# Patient Record
Sex: FEMALE | Race: OTHER | NOT HISPANIC OR LATINO | URBAN - METROPOLITAN AREA
[De-identification: names, ages, dates, MRNs, and addresses within clinical notes are randomized per-mention and may not be internally consistent; named-entity substitution may affect disease eponyms.]

---

## 2019-04-15 ENCOUNTER — EMERGENCY (EMERGENCY)
Facility: HOSPITAL | Age: 19
LOS: 1 days | Discharge: HOME | End: 2019-04-15
Attending: EMERGENCY MEDICINE | Admitting: EMERGENCY MEDICINE
Payer: MEDICAID

## 2019-04-15 VITALS
HEART RATE: 71 BPM | TEMPERATURE: 98 F | DIASTOLIC BLOOD PRESSURE: 66 MMHG | SYSTOLIC BLOOD PRESSURE: 111 MMHG | OXYGEN SATURATION: 100 % | RESPIRATION RATE: 18 BRPM

## 2019-04-15 VITALS
SYSTOLIC BLOOD PRESSURE: 126 MMHG | TEMPERATURE: 98 F | OXYGEN SATURATION: 100 % | HEART RATE: 77 BPM | RESPIRATION RATE: 18 BRPM | DIASTOLIC BLOOD PRESSURE: 49 MMHG

## 2019-04-15 LAB
ALBUMIN SERPL ELPH-MCNC: 4.4 G/DL — SIGNIFICANT CHANGE UP (ref 3.3–5)
ALP SERPL-CCNC: 59 U/L — SIGNIFICANT CHANGE UP (ref 40–120)
ALT FLD-CCNC: 7 U/L — SIGNIFICANT CHANGE UP (ref 4–33)
ANION GAP SERPL CALC-SCNC: 12 MMO/L — SIGNIFICANT CHANGE UP (ref 7–14)
AST SERPL-CCNC: 15 U/L — SIGNIFICANT CHANGE UP (ref 4–32)
BASOPHILS # BLD AUTO: 0.07 K/UL — SIGNIFICANT CHANGE UP (ref 0–0.2)
BASOPHILS NFR BLD AUTO: 1.1 % — SIGNIFICANT CHANGE UP (ref 0–2)
BILIRUB SERPL-MCNC: 0.2 MG/DL — SIGNIFICANT CHANGE UP (ref 0.2–1.2)
BUN SERPL-MCNC: 8 MG/DL — SIGNIFICANT CHANGE UP (ref 7–23)
CALCIUM SERPL-MCNC: 9.7 MG/DL — SIGNIFICANT CHANGE UP (ref 8.4–10.5)
CHLORIDE SERPL-SCNC: 103 MMOL/L — SIGNIFICANT CHANGE UP (ref 98–107)
CO2 SERPL-SCNC: 24 MMOL/L — SIGNIFICANT CHANGE UP (ref 22–31)
CREAT SERPL-MCNC: 0.78 MG/DL — SIGNIFICANT CHANGE UP (ref 0.5–1.3)
EOSINOPHIL # BLD AUTO: 0.02 K/UL — SIGNIFICANT CHANGE UP (ref 0–0.5)
EOSINOPHIL NFR BLD AUTO: 0.3 % — SIGNIFICANT CHANGE UP (ref 0–6)
GLUCOSE SERPL-MCNC: 89 MG/DL — SIGNIFICANT CHANGE UP (ref 70–99)
HCT VFR BLD CALC: 36.6 % — SIGNIFICANT CHANGE UP (ref 34.5–45)
HGB BLD-MCNC: 11.3 G/DL — LOW (ref 11.5–15.5)
IMM GRANULOCYTES NFR BLD AUTO: 0.2 % — SIGNIFICANT CHANGE UP (ref 0–1.5)
LYMPHOCYTES # BLD AUTO: 2.11 K/UL — SIGNIFICANT CHANGE UP (ref 1–3.3)
LYMPHOCYTES # BLD AUTO: 32.9 % — SIGNIFICANT CHANGE UP (ref 13–44)
MCHC RBC-ENTMCNC: 27 PG — SIGNIFICANT CHANGE UP (ref 27–34)
MCHC RBC-ENTMCNC: 30.9 % — LOW (ref 32–36)
MCV RBC AUTO: 87.6 FL — SIGNIFICANT CHANGE UP (ref 80–100)
MONOCYTES # BLD AUTO: 0.48 K/UL — SIGNIFICANT CHANGE UP (ref 0–0.9)
MONOCYTES NFR BLD AUTO: 7.5 % — SIGNIFICANT CHANGE UP (ref 2–14)
NEUTROPHILS # BLD AUTO: 3.73 K/UL — SIGNIFICANT CHANGE UP (ref 1.8–7.4)
NEUTROPHILS NFR BLD AUTO: 58 % — SIGNIFICANT CHANGE UP (ref 43–77)
NRBC # FLD: 0 K/UL — SIGNIFICANT CHANGE UP (ref 0–0)
PLATELET # BLD AUTO: 276 K/UL — SIGNIFICANT CHANGE UP (ref 150–400)
PMV BLD: 11.1 FL — SIGNIFICANT CHANGE UP (ref 7–13)
POTASSIUM SERPL-MCNC: 4.2 MMOL/L — SIGNIFICANT CHANGE UP (ref 3.5–5.3)
POTASSIUM SERPL-SCNC: 4.2 MMOL/L — SIGNIFICANT CHANGE UP (ref 3.5–5.3)
PROT SERPL-MCNC: 7.1 G/DL — SIGNIFICANT CHANGE UP (ref 6–8.3)
RBC # BLD: 4.18 M/UL — SIGNIFICANT CHANGE UP (ref 3.8–5.2)
RBC # FLD: 15.7 % — HIGH (ref 10.3–14.5)
SODIUM SERPL-SCNC: 139 MMOL/L — SIGNIFICANT CHANGE UP (ref 135–145)
WBC # BLD: 6.42 K/UL — SIGNIFICANT CHANGE UP (ref 3.8–10.5)
WBC # FLD AUTO: 6.42 K/UL — SIGNIFICANT CHANGE UP (ref 3.8–10.5)

## 2019-04-15 PROCEDURE — 71045 X-RAY EXAM CHEST 1 VIEW: CPT | Mod: 26

## 2019-04-15 PROCEDURE — 99285 EMERGENCY DEPT VISIT HI MDM: CPT

## 2019-04-15 RX ORDER — IBUPROFEN 200 MG
600 TABLET ORAL ONCE
Qty: 0 | Refills: 0 | Status: COMPLETED | OUTPATIENT
Start: 2019-04-15 | End: 2019-04-15

## 2019-04-15 RX ORDER — MECLIZINE HCL 12.5 MG
25 TABLET ORAL ONCE
Qty: 0 | Refills: 0 | Status: COMPLETED | OUTPATIENT
Start: 2019-04-15 | End: 2019-04-15

## 2019-04-15 RX ADMIN — Medication 600 MILLIGRAM(S): at 20:28

## 2019-04-15 RX ADMIN — Medication 25 MILLIGRAM(S): at 20:52

## 2019-04-15 NOTE — ED PROVIDER NOTE - PROGRESS NOTE DETAILS
Haverty PGY1- sx improved, feels less lightheaded, pt able to heel walk, toe walk, heel to toe walk, heel to shin test without issue, no obvious neuro abnormality on exam, pt and family comfortable going home, d/c with neuro f/u

## 2019-04-15 NOTE — ED PROVIDER NOTE - OBJECTIVE STATEMENT
19:41 Cece att: 18F   Three days ago patient felt lightheaded, near syncope, ok, then syncoplized, pos head trauma. Today headache.     IMM PMH PSH MED ALL SMOKE PCP PHARMACY 19:41 Cece att: 18F   Three days ago patient felt lightheaded, near syncope, ok, then syncoplized, pos head trauma. Today headache.     IMM PMH PSH MED ALL SMOKE PCP PHARMACY    Haverty PGY1- 18 yoF, otherwise heatlhy, had syncopal event 3 days ago after shower. Initially felt pre syncopal, then off balance but then had full episode. Thinks she hit her head. Had bruise to LLE. Has had intermittent headache, lightheadedness since then. sent to ED for PCP today. No CP, SOB, NVD, abd/chest/back pain. Denies urinary sx. No motor/sensory deficit. LNMP 7 days ago. Lasted 7-8 days. SBIRT neg. Denies sexually active, substance. No prior medical or surgical history. 19:41 Cece att: 18F syncope 3 days ago p/w ha today. Three days ago patient in shower felt lightheaded, near syncope, ok, then syncoplized, pos head trauma. Since then intermittent frontal ha, lightheadeness, and decr concentration. Today headache. Went to PCP, referred to ED for further workup. Denies worsening ha, visual   Haverty PGY1- 18 yoF, otherwise heatlhy, had syncopal event 3 days ago after shower. Initially felt pre syncopal, then off balance but then had full episode. Thinks she hit her head. Had bruise to LLE. Has had intermittent headache, lightheadedness since then. sent to ED for PCP today. No CP, SOB, NVD, abd/chest/back pain. Denies urinary sx. No motor/sensory deficit. LNMP 7 days ago. Lasted 7-8 days. SBIRT neg. Denies sexually active, substance. No prior medical or surgical history.

## 2019-04-15 NOTE — ED PROVIDER NOTE - NSFOLLOWUPINSTRUCTIONS_ED_ALL_ED_FT
You have lightheadedness. We gave you medication for vertigo which seemed to help.    You can buy this over the counter, Antivert (meclizine). Take as directed on bottle.     Please follow up with your doctor.    Attached is contact information for neurology.     Return to ER for new or concerning symptoms.

## 2019-04-15 NOTE — ED PROVIDER NOTE - NSFOLLOWUPCLINICS_GEN_ALL_ED_FT
Pediatric Neurology  Pediatric Neurology  [address] [place], [place] [id number]  Phone: [phone]  Fax: [phone]  Follow Up Time:

## 2019-04-15 NOTE — ED ADULT TRIAGE NOTE - CHIEF COMPLAINT QUOTE
Pt has hx of conversion disorder. Had syncopal episode x 3 days ago. Pt was by herself at home when event happened, pt states she think she had LOC for a few minutes. Pt has bruise to left thigh after she syncopized. Co dizziness, headache and imbalance since syncopal episode. Sent by pcp for eval.

## 2019-04-15 NOTE — ED PROVIDER NOTE - CRANIAL NERVE AND PUPILLARY EXAM
CENTRAL VISION NOT INTACT/cranial nerves 2-12 intact/extra-ocular movements intact/tongue is midline

## 2019-04-15 NOTE — ED PROVIDER NOTE - PHYSICAL EXAMINATION
d/w  PHYSICAL EXAM:  GENERAL: NAD, well-groomed, well-developed  HEAD:  Atraumatic, Normocephalic  EYES: EOMI, PERRLA, conjunctiva and sclera clear  ENMT: No tonsillar erythema, exudates, or enlargement; Moist mucous membranes  NECK: Supple, No JVD  HEART: Regular rate and rhythm; No murmurs, rubs, or gallops  RESPIRATORY: CTA B/L, No W/R/R  ABDOMEN: Soft, Nontender, Nondistended  NEURO: A&Ox3, 5/5 strength, no dysmetria, slightly unsteady gait PERRL, cranial nerves intact, nystagmus to R with horizontal gaze  EXTREMITIES:  2+ Peripheral Pulses, No clubbing, cyanosis, or edema  SKIN: warm, dry, normal color, no rash or abnormal lesions

## 2019-04-15 NOTE — ED PROVIDER NOTE - CLINICAL SUMMARY MEDICAL DECISION MAKING FREE TEXT BOX
Mirtha PGY1- 18 yoF, syncope 3 days ago, persistent lightheaded, headache, no vomiting, gait issues, denies illness, urinary sx  gait instability on exam, nystagmus   labs, upreg, ua, ekg, cxr, meclizine, consider imaging if sx don't improve

## 2019-04-15 NOTE — ED PROVIDER NOTE - ATTENDING CONTRIBUTION TO CARE
Dr. Zhao: I have personally seen and examined this patient at the bedside. I have fully participated in the care of this patient. I have reviewed all pertinent clinical information, including history, physical exam, plan and the Resident's note and agree except as noted. HPI above as by me. PE above as by me. EKG nonisch DDX acute concussive syndrome versus peripheral vertigo PLAN meclizine prn, motrin, cbc, cmp, upreg, cxr DISPO reasssess, possible dc home.